# Patient Record
Sex: FEMALE | Race: WHITE | URBAN - NONMETROPOLITAN AREA
[De-identification: names, ages, dates, MRNs, and addresses within clinical notes are randomized per-mention and may not be internally consistent; named-entity substitution may affect disease eponyms.]

---

## 2023-02-10 ENCOUNTER — OFFICE VISIT (OUTPATIENT)
Dept: URBAN - NONMETROPOLITAN AREA CLINIC 13 | Facility: CLINIC | Age: 48
End: 2023-02-10

## 2023-02-10 ENCOUNTER — OFFICE VISIT (OUTPATIENT)
Dept: URBAN - NONMETROPOLITAN AREA CLINIC 2 | Facility: CLINIC | Age: 48
End: 2023-02-10

## 2023-03-02 ENCOUNTER — OFFICE VISIT (OUTPATIENT)
Dept: URBAN - NONMETROPOLITAN AREA CLINIC 2 | Facility: CLINIC | Age: 48
End: 2023-03-02
Payer: COMMERCIAL

## 2023-03-02 ENCOUNTER — LAB OUTSIDE AN ENCOUNTER (OUTPATIENT)
Dept: URBAN - NONMETROPOLITAN AREA CLINIC 2 | Facility: CLINIC | Age: 48
End: 2023-03-02

## 2023-03-02 VITALS
WEIGHT: 214 LBS | BODY MASS INDEX: 36.54 KG/M2 | DIASTOLIC BLOOD PRESSURE: 86 MMHG | HEART RATE: 81 BPM | SYSTOLIC BLOOD PRESSURE: 130 MMHG | HEIGHT: 64 IN

## 2023-03-02 DIAGNOSIS — K21.9 GASTROESOPHAGEAL REFLUX DISEASE, UNSPECIFIED WHETHER ESOPHAGITIS PRESENT: ICD-10-CM

## 2023-03-02 DIAGNOSIS — R19.5 LOOSE STOOLS: ICD-10-CM

## 2023-03-02 DIAGNOSIS — K60.1 CHRONIC ANAL FISSURE: ICD-10-CM

## 2023-03-02 PROBLEM — 711150003: Status: ACTIVE | Noted: 2023-03-02

## 2023-03-02 PROCEDURE — 99204 OFFICE O/P NEW MOD 45 MIN: CPT | Performed by: INTERNAL MEDICINE

## 2023-03-02 PROCEDURE — 99244 OFF/OP CNSLTJ NEW/EST MOD 40: CPT | Performed by: INTERNAL MEDICINE

## 2023-03-02 RX ORDER — CITALOPRAM 40 MG/1
0.5 TABLET TABLET, FILM COATED ORAL ONCE A DAY
Status: ACTIVE | COMMUNITY

## 2023-03-02 RX ORDER — NALTREXONE HYDROCHLORIDE AND BUPROPION HYDROCHLORIDE 8; 90 MG/1; MG/1
2 TABLETS TABLET, EXTENDED RELEASE ORAL TWICE A DAY
Status: ACTIVE | COMMUNITY

## 2023-03-02 NOTE — HPI-TODAY'S VISIT:
3/2/23 Ms. Arshad was referred to our office for evaluation of screening for colon cancer by Dr. Maximo Wong. A copy of this note and recommendations will be sent to the referring provider's office. Patient denies nausea, vomiting, abdominal pain,  diarrhea, melena, hematochezia, anemia and unintentional weight loss. Takes esomeprazole 1-2 years and reports well manages her GERD symptoms. She denies a change in bowel habits and bowel movements are reported normal and daily. She did have bought of diarrhea after multiple antibiotics for sinus infection, now cleared. However she reports following this she has what she feels could be a fissure or irriated tissue perianal that has some blood when she wipes.  Endorses bowel issues most of her life, cycles between loose and constipation, more so loose recently. BMs 1x per day. now after antibiotics 2x per day Some bloating before but fine now, no abd cramping. Her bowel output is improving. Denies history of smoking, tobacco use or drug use. Used to drink daily, cut back for weight loss as she lin hunts. Has family history of colon cancer grandma on mom's side. She is on Eliquis for DVT and PE managed by Dr. Wong and felt to be related to hormonal contraceptive use.  She now has an IUD. 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 SP

## 2023-05-18 ENCOUNTER — OFFICE VISIT (OUTPATIENT)
Dept: URBAN - METROPOLITAN AREA MEDICAL CENTER 1 | Facility: MEDICAL CENTER | Age: 48
End: 2023-05-18
Payer: COMMERCIAL

## 2023-05-18 ENCOUNTER — LAB OUTSIDE AN ENCOUNTER (OUTPATIENT)
Dept: URBAN - NONMETROPOLITAN AREA CLINIC 2 | Facility: CLINIC | Age: 48
End: 2023-05-18

## 2023-05-18 DIAGNOSIS — D12.2 ADENOMA OF ASCENDING COLON: ICD-10-CM

## 2023-05-18 DIAGNOSIS — Z12.11 COLON CANCER SCREENING: ICD-10-CM

## 2023-05-18 PROCEDURE — 45385 COLONOSCOPY W/LESION REMOVAL: CPT | Performed by: INTERNAL MEDICINE

## 2023-05-21 LAB
AP CASE REPORT: (no result)
AP FINAL DIAGNOSIS: (no result)
AP GROSS DESCRIPTION: (no result)
AP MICROSCOPIC DESCRIPTION: (no result)

## 2023-07-10 ENCOUNTER — OFFICE VISIT (OUTPATIENT)
Dept: URBAN - NONMETROPOLITAN AREA CLINIC 2 | Facility: CLINIC | Age: 48
End: 2023-07-10
Payer: COMMERCIAL

## 2023-07-10 ENCOUNTER — DASHBOARD ENCOUNTERS (OUTPATIENT)
Age: 48
End: 2023-07-10

## 2023-07-10 ENCOUNTER — WEB ENCOUNTER (OUTPATIENT)
Dept: URBAN - NONMETROPOLITAN AREA CLINIC 2 | Facility: CLINIC | Age: 48
End: 2023-07-10

## 2023-07-10 VITALS
WEIGHT: 210 LBS | BODY MASS INDEX: 35.85 KG/M2 | HEART RATE: 82 BPM | HEIGHT: 64 IN | SYSTOLIC BLOOD PRESSURE: 138 MMHG | DIASTOLIC BLOOD PRESSURE: 92 MMHG

## 2023-07-10 DIAGNOSIS — Z79.01 CHRONIC ANTICOAGULATION: ICD-10-CM

## 2023-07-10 DIAGNOSIS — K21.9 ACID REFLUX: ICD-10-CM

## 2023-07-10 DIAGNOSIS — K60.2 ANAL FISSURE: ICD-10-CM

## 2023-07-10 DIAGNOSIS — Z12.11 SCREENING FOR COLON CANCER: ICD-10-CM

## 2023-07-10 PROBLEM — 398032003: Status: ACTIVE | Noted: 2023-03-02

## 2023-07-10 PROBLEM — 235595009: Status: ACTIVE | Noted: 2023-03-02

## 2023-07-10 PROBLEM — 30037006: Status: ACTIVE | Noted: 2023-03-02

## 2023-07-10 PROBLEM — 305058001: Status: ACTIVE | Noted: 2023-03-02

## 2023-07-10 PROCEDURE — 99212 OFFICE O/P EST SF 10 MIN: CPT | Performed by: INTERNAL MEDICINE

## 2023-07-10 RX ORDER — CITALOPRAM 40 MG/1
0.5 TABLET TABLET, FILM COATED ORAL ONCE A DAY
Status: ACTIVE | COMMUNITY

## 2023-07-10 RX ORDER — NALTREXONE HYDROCHLORIDE AND BUPROPION HYDROCHLORIDE 8; 90 MG/1; MG/1
2 TABLETS TABLET, EXTENDED RELEASE ORAL TWICE A DAY
Status: ACTIVE | COMMUNITY

## 2023-07-10 NOTE — HPI-TODAY'S VISIT:
3/2/23 Ms. Arshad was referred to our office for evaluation of screening for colon cancer by Dr. Maximo Wong. A copy of this note and recommendations will be sent to the referring provider's office. Patient denies nausea, vomiting, abdominal pain,  diarrhea, melena, hematochezia, anemia and unintentional weight loss. Takes esomeprazole 1-2 years and reports well manages her GERD symptoms. She denies a change in bowel habits and bowel movements are reported normal and daily. She did have bought of diarrhea after multiple antibiotics for sinus infection, now cleared. However she reports following this she has what she feels could be a fissure or irriated tissue perianal that has some blood when she wipes.  Endorses bowel issues most of her life, cycles between loose and constipation, more so loose recently. BMs 1x per day. now after antibiotics 2x per day Some bloating before but fine now, no abd cramping. Her bowel output is improving. Denies history of smoking, tobacco use or drug use. Used to drink daily, cut back for weight loss as she lin hunts. Has family history of colon cancer grandma on mom's side. She is on Eliquis for DVT and PE managed by Dr. Wong and felt to be related to hormonal contraceptive use.  She now has an IUD. 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 SP  7/10/2023 Ms. Arshda returns to clinic she has only some scant rectal bleeding but no hemorrhoids or fissures were visualized during colonoscopy. We discussed this could continue while she is on anticoagulation and could be from irritation as well. Colonoscopy completed with removal of one small polyp, TA on path she is due to repeat in 5 years. She is working on weight loss with goal to wean PPI with her PCP and reports improving stool.  She agrees to try fiber and florastor. We will see her back if needed, otherwise she can return to clinic in 5 years.  ADONIS